# Patient Record
Sex: MALE | Race: WHITE | URBAN - METROPOLITAN AREA
[De-identification: names, ages, dates, MRNs, and addresses within clinical notes are randomized per-mention and may not be internally consistent; named-entity substitution may affect disease eponyms.]

---

## 2017-05-02 ENCOUNTER — OFFICE VISIT (OUTPATIENT)
Dept: FAMILY MEDICINE CLINIC | Age: 42
End: 2017-05-02

## 2017-05-02 VITALS
SYSTOLIC BLOOD PRESSURE: 114 MMHG | DIASTOLIC BLOOD PRESSURE: 64 MMHG | WEIGHT: 166 LBS | HEART RATE: 80 BPM | BODY MASS INDEX: 22.48 KG/M2 | HEIGHT: 72 IN

## 2017-05-02 DIAGNOSIS — R10.31 CHRONIC RLQ PAIN: Primary | ICD-10-CM

## 2017-05-02 DIAGNOSIS — G89.29 CHRONIC RLQ PAIN: Primary | ICD-10-CM

## 2017-05-02 PROCEDURE — 99213 OFFICE O/P EST LOW 20 MIN: CPT | Performed by: FAMILY MEDICINE

## 2017-05-02 RX ORDER — LEVOFLOXACIN 500 MG/1
500 TABLET, FILM COATED ORAL DAILY
Qty: 7 TABLET | Refills: 0 | Status: SHIPPED | OUTPATIENT
Start: 2017-05-02 | End: 2017-05-15 | Stop reason: SDUPTHER

## 2017-05-02 ASSESSMENT — PATIENT HEALTH QUESTIONNAIRE - PHQ9
SUM OF ALL RESPONSES TO PHQ QUESTIONS 1-9: 0
2. FEELING DOWN, DEPRESSED OR HOPELESS: 0
1. LITTLE INTEREST OR PLEASURE IN DOING THINGS: 0
SUM OF ALL RESPONSES TO PHQ9 QUESTIONS 1 & 2: 0

## 2017-05-15 ENCOUNTER — OFFICE VISIT (OUTPATIENT)
Dept: FAMILY MEDICINE CLINIC | Age: 42
End: 2017-05-15

## 2017-05-15 VITALS
HEIGHT: 72 IN | SYSTOLIC BLOOD PRESSURE: 110 MMHG | BODY MASS INDEX: 22.35 KG/M2 | DIASTOLIC BLOOD PRESSURE: 60 MMHG | WEIGHT: 165 LBS | HEART RATE: 84 BPM

## 2017-05-15 DIAGNOSIS — Z00.00 ROUTINE GENERAL MEDICAL EXAMINATION AT A HEALTH CARE FACILITY: Primary | ICD-10-CM

## 2017-05-15 DIAGNOSIS — E03.9 ACQUIRED HYPOTHYROIDISM: ICD-10-CM

## 2017-05-15 DIAGNOSIS — R10.31 RLQ ABDOMINAL PAIN: ICD-10-CM

## 2017-05-15 DIAGNOSIS — Z13.220 SCREENING, LIPID: ICD-10-CM

## 2017-05-15 DIAGNOSIS — Z12.5 SCREENING FOR PROSTATE CANCER: ICD-10-CM

## 2017-05-15 LAB
CHOLESTEROL, TOTAL: 138 MG/DL (ref 0–199)
HDLC SERPL-MCNC: 53 MG/DL (ref 40–60)
LDL CHOLESTEROL CALCULATED: 65 MG/DL
PROSTATE SPECIFIC ANTIGEN: 0.66 NG/ML (ref 0–4)
T4 FREE: 1.6 NG/DL (ref 0.9–1.8)
TRIGL SERPL-MCNC: 101 MG/DL (ref 0–150)
TSH SERPL DL<=0.05 MIU/L-ACNC: 1.51 UIU/ML (ref 0.27–4.2)
VLDLC SERPL CALC-MCNC: 20 MG/DL

## 2017-05-15 PROCEDURE — 36415 COLL VENOUS BLD VENIPUNCTURE: CPT | Performed by: FAMILY MEDICINE

## 2017-05-15 PROCEDURE — 99396 PREV VISIT EST AGE 40-64: CPT | Performed by: FAMILY MEDICINE

## 2017-05-15 RX ORDER — LEVOFLOXACIN 500 MG/1
500 TABLET, FILM COATED ORAL DAILY
Qty: 7 TABLET | Refills: 0 | Status: SHIPPED | OUTPATIENT
Start: 2017-05-15 | End: 2017-05-22

## 2017-06-05 RX ORDER — LEVOTHYROXINE SODIUM 175 UG/1
TABLET ORAL
Qty: 90 TABLET | Refills: 3 | Status: SHIPPED | OUTPATIENT
Start: 2017-06-05 | End: 2018-05-10 | Stop reason: SDUPTHER

## 2017-10-12 RX ORDER — CLOBETASOL PROPIONATE 0.5 MG/G
CREAM TOPICAL
Qty: 60 G | Refills: 6 | Status: SHIPPED | OUTPATIENT
Start: 2017-10-12 | End: 2017-10-30 | Stop reason: SDUPTHER

## 2017-10-30 RX ORDER — CLOBETASOL PROPIONATE 0.5 MG/G
CREAM TOPICAL
Qty: 60 G | Refills: 6 | Status: SHIPPED | OUTPATIENT
Start: 2017-10-30 | End: 2019-09-04 | Stop reason: SDUPTHER

## 2018-05-11 RX ORDER — LEVOTHYROXINE SODIUM 175 UG/1
TABLET ORAL
Qty: 90 TABLET | Refills: 3 | Status: SHIPPED | OUTPATIENT
Start: 2018-05-11 | End: 2019-05-22 | Stop reason: SDUPTHER

## 2019-01-03 ENCOUNTER — OFFICE VISIT (OUTPATIENT)
Dept: FAMILY MEDICINE CLINIC | Age: 44
End: 2019-01-03
Payer: COMMERCIAL

## 2019-01-03 VITALS
WEIGHT: 158 LBS | BODY MASS INDEX: 21.4 KG/M2 | HEART RATE: 96 BPM | RESPIRATION RATE: 16 BRPM | HEIGHT: 72 IN | OXYGEN SATURATION: 99 % | SYSTOLIC BLOOD PRESSURE: 127 MMHG | TEMPERATURE: 97.9 F | DIASTOLIC BLOOD PRESSURE: 73 MMHG

## 2019-01-03 DIAGNOSIS — J01.10 ACUTE NON-RECURRENT FRONTAL SINUSITIS: Primary | ICD-10-CM

## 2019-01-03 DIAGNOSIS — E03.9 ACQUIRED HYPOTHYROIDISM: ICD-10-CM

## 2019-01-03 LAB
T4 FREE: 1.2 NG/DL (ref 0.9–1.8)
TSH SERPL DL<=0.05 MIU/L-ACNC: 3.86 UIU/ML (ref 0.27–4.2)

## 2019-01-03 PROCEDURE — 99214 OFFICE O/P EST MOD 30 MIN: CPT | Performed by: FAMILY MEDICINE

## 2019-01-03 PROCEDURE — 36415 COLL VENOUS BLD VENIPUNCTURE: CPT | Performed by: FAMILY MEDICINE

## 2019-01-03 RX ORDER — AZITHROMYCIN 250 MG/1
TABLET, FILM COATED ORAL
Qty: 1 PACKET | Refills: 0 | Status: SHIPPED | OUTPATIENT
Start: 2019-01-03 | End: 2019-01-13

## 2019-01-03 RX ORDER — PREDNISONE 10 MG/1
10 TABLET ORAL 2 TIMES DAILY
Qty: 10 TABLET | Refills: 0 | Status: SHIPPED | OUTPATIENT
Start: 2019-01-03 | End: 2019-01-08

## 2019-05-22 RX ORDER — LEVOTHYROXINE SODIUM 175 UG/1
TABLET ORAL
Qty: 90 TABLET | Refills: 3 | Status: SHIPPED | OUTPATIENT
Start: 2019-05-22 | End: 2020-06-09

## 2019-09-04 ENCOUNTER — OFFICE VISIT (OUTPATIENT)
Dept: DERMATOLOGY | Age: 44
End: 2019-09-04
Payer: COMMERCIAL

## 2019-09-04 DIAGNOSIS — L40.9 PSORIASIS: Primary | ICD-10-CM

## 2019-09-04 PROCEDURE — 99202 OFFICE O/P NEW SF 15 MIN: CPT | Performed by: DERMATOLOGY

## 2019-09-04 RX ORDER — TACROLIMUS 1 MG/G
OINTMENT TOPICAL
Qty: 60 G | Refills: 6 | Status: SHIPPED | OUTPATIENT
Start: 2019-09-04

## 2019-09-04 RX ORDER — CLOBETASOL PROPIONATE 0.5 MG/G
CREAM TOPICAL
Qty: 60 G | Refills: 11 | Status: SHIPPED | OUTPATIENT
Start: 2019-09-04

## 2019-09-04 NOTE — PROGRESS NOTES
[facility]) Dermatology  Sean Rich M.D.  [phone]       Roya Linares  1975    37 y.o. male     Date of Visit: 9/4/2019    Chief Complaint:   Chief Complaint   Patient presents with    Psoriasis     discuss medications         I was asked to see this patient by Dr. Gonzalez ref. provider found. History of Present Illness:  1. Patient presents today for follow-up of psoriasis-has not been seen in over 3 years. Had previously been using clobetasol cream as needed for his lower legs with excellent improvement. Also uses Protopic 0.1% ointment on his cheeks-within beard area-with excellent improvement. Uses both of these as needed. Also uses an antidandruff shampoo for his scalp but has not been using this to his beard area. Has been limiting his use of clobetasol cream.  Uses this until clear and then avoids this. Continues to be effective. Review of Systems:  Constitutional: Reports general sense of well-being       Past Medical History, Surgical History, Family History, Medications and Allergies reviewed. Social History:   Social History     Socioeconomic History    Marital status:      Spouse name: Not on file    Number of children: Not on file    Years of education: Not on file    Highest education level: Not on file   Occupational History    Not on file   Social Needs    Financial resource strain: Not on file    Food insecurity:     Worry: Not on file     Inability: Not on file    Transportation needs:     Medical: Not on file     Non-medical: Not on file   Tobacco Use    Smoking status: Never Smoker    Smokeless tobacco: Never Used   Substance and Sexual Activity    Alcohol use:  Yes    Drug use: Not on file    Sexual activity: Not on file   Lifestyle    Physical activity:     Days per week: Not on file     Minutes per session: Not on file    Stress: Not on file   Relationships    Social connections:     Talks on phone: Not on file     Gets together: Not on file     Attends Christian service: Not on file     Active member of club or organization: Not on file     Attends meetings of clubs or organizations: Not on file     Relationship status: Not on file    Intimate partner violence:     Fear of current or ex partner: Not on file     Emotionally abused: Not on file     Physically abused: Not on file     Forced sexual activity: Not on file   Other Topics Concern    Not on file   Social History Narrative    Not on file       Physical Examination       -General: Well-appearing, NAD  1. Well-developed well-nourished. Erythematous well-demarcated scaly plaques bilateral lower legs. Mild erythema and scale upper lip within mustache. Scalp relatively clear today  Total body surface area less than 5%    Assessment and Plan     1. Psoriasis-discussed treatment options for patient. Reviewed immunosuppressants which he is not interested in for now but we could revisit if his psoriasis worsens. Protopic 0.1% ointment as needed for his face-also discussed washing with a zinc pyrithione based antidandruff shampoo for both his scalp and beard. Clobetasol cream twice daily up to 2 weeks as needed bilateral lower legs. Refills provided.   Reviewed proper use and side effects

## 2019-09-17 ENCOUNTER — TELEPHONE (OUTPATIENT)
Dept: FAMILY MEDICINE CLINIC | Age: 44
End: 2019-09-17

## 2019-09-17 ENCOUNTER — OFFICE VISIT (OUTPATIENT)
Dept: FAMILY MEDICINE CLINIC | Age: 44
End: 2019-09-17
Payer: COMMERCIAL

## 2019-09-17 VITALS
DIASTOLIC BLOOD PRESSURE: 80 MMHG | HEIGHT: 72 IN | WEIGHT: 160.8 LBS | BODY MASS INDEX: 21.78 KG/M2 | OXYGEN SATURATION: 98 % | HEART RATE: 85 BPM | SYSTOLIC BLOOD PRESSURE: 130 MMHG | RESPIRATION RATE: 18 BRPM | TEMPERATURE: 98.1 F

## 2019-09-17 DIAGNOSIS — L03.116 CELLULITIS OF LEFT LOWER EXTREMITY: Primary | ICD-10-CM

## 2019-09-17 PROCEDURE — 99213 OFFICE O/P EST LOW 20 MIN: CPT | Performed by: FAMILY MEDICINE

## 2019-09-17 RX ORDER — CLINDAMYCIN HYDROCHLORIDE 300 MG/1
300 CAPSULE ORAL 4 TIMES DAILY
Qty: 28 CAPSULE | Refills: 0 | Status: SHIPPED | OUTPATIENT
Start: 2019-09-17 | End: 2019-09-24

## 2019-09-17 ASSESSMENT — PATIENT HEALTH QUESTIONNAIRE - PHQ9
SUM OF ALL RESPONSES TO PHQ QUESTIONS 1-9: 0
SUM OF ALL RESPONSES TO PHQ9 QUESTIONS 1 & 2: 0
1. LITTLE INTEREST OR PLEASURE IN DOING THINGS: 0
SUM OF ALL RESPONSES TO PHQ QUESTIONS 1-9: 0
2. FEELING DOWN, DEPRESSED OR HOPELESS: 0

## 2019-09-25 ENCOUNTER — OFFICE VISIT (OUTPATIENT)
Dept: FAMILY MEDICINE CLINIC | Age: 44
End: 2019-09-25
Payer: COMMERCIAL

## 2019-09-25 VITALS
SYSTOLIC BLOOD PRESSURE: 110 MMHG | DIASTOLIC BLOOD PRESSURE: 69 MMHG | HEIGHT: 72 IN | BODY MASS INDEX: 21.81 KG/M2 | WEIGHT: 161 LBS | OXYGEN SATURATION: 98 % | RESPIRATION RATE: 18 BRPM | HEART RATE: 78 BPM

## 2019-09-25 DIAGNOSIS — L03.116 CELLULITIS OF LEFT LOWER EXTREMITY: ICD-10-CM

## 2019-09-25 DIAGNOSIS — Z12.5 SCREENING FOR MALIGNANT NEOPLASM OF PROSTATE: ICD-10-CM

## 2019-09-25 DIAGNOSIS — Z00.00 ROUTINE GENERAL MEDICAL EXAMINATION AT A HEALTH CARE FACILITY: Primary | ICD-10-CM

## 2019-09-25 DIAGNOSIS — Z13.220 SCREENING, LIPID: ICD-10-CM

## 2019-09-25 LAB
CHOLESTEROL, TOTAL: 143 MG/DL (ref 0–199)
HDLC SERPL-MCNC: 42 MG/DL (ref 40–60)
LDL CHOLESTEROL CALCULATED: 66 MG/DL
PROSTATE SPECIFIC ANTIGEN: 0.72 NG/ML (ref 0–4)
TRIGL SERPL-MCNC: 174 MG/DL (ref 0–150)
VLDLC SERPL CALC-MCNC: 35 MG/DL

## 2019-09-25 PROCEDURE — 99396 PREV VISIT EST AGE 40-64: CPT | Performed by: FAMILY MEDICINE

## 2019-09-25 PROCEDURE — 36415 COLL VENOUS BLD VENIPUNCTURE: CPT | Performed by: FAMILY MEDICINE

## 2019-09-25 NOTE — PROGRESS NOTES
Chief Complaint   Patient presents with    Cellulitis     No family history on file. Social History     Socioeconomic History    Marital status:      Spouse name: Not on file    Number of children: Not on file    Years of education: Not on file    Highest education level: Not on file   Occupational History    Not on file   Social Needs    Financial resource strain: Not on file    Food insecurity:     Worry: Not on file     Inability: Not on file    Transportation needs:     Medical: Not on file     Non-medical: Not on file   Tobacco Use    Smoking status: Never Smoker    Smokeless tobacco: Never Used   Substance and Sexual Activity    Alcohol use:  Yes    Drug use: Not on file    Sexual activity: Not on file   Lifestyle    Physical activity:     Days per week: Not on file     Minutes per session: Not on file    Stress: Not on file   Relationships    Social connections:     Talks on phone: Not on file     Gets together: Not on file     Attends Sabianism service: Not on file     Active member of club or organization: Not on file     Attends meetings of clubs or organizations: Not on file     Relationship status: Not on file    Intimate partner violence:     Fear of current or ex partner: Not on file     Emotionally abused: Not on file     Physically abused: Not on file     Forced sexual activity: Not on file   Other Topics Concern    Not on file   Social History Narrative    Not on file       Current Outpatient Medications:     clobetasol (TEMOVATE) 0.05 % cream, Apply to affected area BID PRN flares, Disp: 60 g, Rfl: 11    tacrolimus (PROTOPIC) 0.1 % ointment, Apply to face/ groin BID, Disp: 60 g, Rfl: 6    levothyroxine (SYNTHROID) 175 MCG tablet, TAKE 1 TABLET BY MOUTH EVERY DAY, Disp: 90 tablet, Rfl: 3  No Known Allergies    Patient Active Problem List   Diagnosis    Pruritus ani    Influenza with respiratory manifestation other than pneumonia    Allergic rhinitis   

## 2020-06-09 RX ORDER — LEVOTHYROXINE SODIUM 175 UG/1
TABLET ORAL
Qty: 90 TABLET | Refills: 3 | Status: SHIPPED | OUTPATIENT
Start: 2020-06-09 | End: 2021-06-11

## 2021-01-15 ENCOUNTER — TELEPHONE (OUTPATIENT)
Dept: FAMILY MEDICINE CLINIC | Age: 46
End: 2021-01-15

## 2021-01-15 NOTE — TELEPHONE ENCOUNTER
Pt called in wanting to know if Dr. Nick Puckett had any recommendations for someone who can do a vasectomy on the [address] [place] [address] side.      Please call PT back: [phone]

## 2021-01-15 NOTE — TELEPHONE ENCOUNTER
Yes.  Pt needs OV F2F FEB for thryoid f/u  Lab Results   Component Value Date    TSH 3.86 01/03/2019     Suggest we arrange at that visit

## 2021-01-29 ENCOUNTER — OFFICE VISIT (OUTPATIENT)
Dept: FAMILY MEDICINE CLINIC | Age: 46
End: 2021-01-29
Payer: COMMERCIAL

## 2021-01-29 VITALS
BODY MASS INDEX: 23.7 KG/M2 | HEIGHT: 72 IN | TEMPERATURE: 97.9 F | WEIGHT: 175 LBS | SYSTOLIC BLOOD PRESSURE: 116 MMHG | HEART RATE: 82 BPM | DIASTOLIC BLOOD PRESSURE: 72 MMHG | OXYGEN SATURATION: 98 %

## 2021-01-29 DIAGNOSIS — Z00.00 ROUTINE GENERAL MEDICAL EXAMINATION AT A HEALTH CARE FACILITY: Primary | ICD-10-CM

## 2021-01-29 DIAGNOSIS — Z13.220 SCREENING, LIPID: ICD-10-CM

## 2021-01-29 DIAGNOSIS — Z12.5 SCREENING FOR MALIGNANT NEOPLASM OF PROSTATE: ICD-10-CM

## 2021-01-29 DIAGNOSIS — Z30.09 FAMILY PLANNING: ICD-10-CM

## 2021-01-29 DIAGNOSIS — E03.9 ACQUIRED HYPOTHYROIDISM: ICD-10-CM

## 2021-01-29 LAB
CHOLESTEROL, TOTAL: 160 MG/DL (ref 0–199)
HDLC SERPL-MCNC: 43 MG/DL (ref 40–60)
LDL CHOLESTEROL CALCULATED: 86 MG/DL
PROSTATE SPECIFIC ANTIGEN: 0.49 NG/ML (ref 0–4)
T4 FREE: 1.4 NG/DL (ref 0.9–1.8)
TRIGL SERPL-MCNC: 156 MG/DL (ref 0–150)
TSH SERPL DL<=0.05 MIU/L-ACNC: 2.7 UIU/ML (ref 0.27–4.2)
VLDLC SERPL CALC-MCNC: 31 MG/DL

## 2021-01-29 PROCEDURE — 36415 COLL VENOUS BLD VENIPUNCTURE: CPT | Performed by: FAMILY MEDICINE

## 2021-01-29 PROCEDURE — 99396 PREV VISIT EST AGE 40-64: CPT | Performed by: FAMILY MEDICINE

## 2021-01-29 ASSESSMENT — PATIENT HEALTH QUESTIONNAIRE - PHQ9
SUM OF ALL RESPONSES TO PHQ QUESTIONS 1-9: 0
SUM OF ALL RESPONSES TO PHQ QUESTIONS 1-9: 0
2. FEELING DOWN, DEPRESSED OR HOPELESS: 0
SUM OF ALL RESPONSES TO PHQ9 QUESTIONS 1 & 2: 0
SUM OF ALL RESPONSES TO PHQ QUESTIONS 1-9: 0
1. LITTLE INTEREST OR PLEASURE IN DOING THINGS: 0

## 2021-01-29 NOTE — PROGRESS NOTES
2021    Nelda Barton (:  1975) is a 39 y.o. male, here for evaluation of the following chief complaint(s):  Follow-up and Referral - General (wants a referral to get a vasectomy )      ASSESSMENT/PLAN:     Diagnosis Orders   1. Routine general medical examination at a health care facility     2. Screening, lipid  Lipid Panel   3. Screening for malignant neoplasm of prostate  Psa screening   4. Acquired hypothyroidism  TSH without Reflex    T4, Free   5. Family planning  Mohan Long MD, [facility], [facility]    ref Dr. Irineo Sánchez       Return in about 1 year (around 2022). An electronic signature was used to authenticate this note.     @SIG@    SUBJECTIVE/OBJECTIVE:    HPI / ROS  # Preventive and other issues  # Lipids - recent screening history:  Lab Results   Component Value Date    LDLCALC 66 2019     Lab Results   Component Value Date    TRIG 174 (H) 2019     Lab Results   Component Value Date    HDL 42 2019     Lab Results   Component Value Date    CHOL 143 2019     # PSA screening history:  Lab Results   Component Value Date    PSA 0.72 2019    PSA 0.66 05/15/2017     # hypothyroidism on Levo  Lab Results   Component Value Date    TSH 3.86 2019             Wt Readings from Last 3 Encounters:   21 175 lb (79.4 kg)   19 161 lb (73 kg)   19 160 lb 12.8 oz (72.9 kg)       BP Readings from Last 3 Encounters:   21 116/72   19 110/69   19 130/80       PHYSICAL EXAM  Vitals:    21 1451   BP: 116/72   Pulse: 82   Temp: 97.9 °F (36.6 °C)   SpO2: 98%   Weight: 175 lb (79.4 kg)   Height: 6' (1.829 m)     A&o  Car reg no MGR  Lungs cta

## 2021-09-07 NOTE — TELEPHONE ENCOUNTER
Request levothyroxine last filled 6/11/21                  Last ov 1/29/21                                                   Next ov not scheduled

## 2021-09-08 RX ORDER — LEVOTHYROXINE SODIUM 175 UG/1
TABLET ORAL
Qty: 90 TABLET | Refills: 3 | Status: SHIPPED | OUTPATIENT
Start: 2021-09-08 | End: 2022-09-01

## 2021-12-06 ENCOUNTER — VIRTUAL VISIT (OUTPATIENT)
Dept: FAMILY MEDICINE CLINIC | Age: 46
End: 2021-12-06
Payer: COMMERCIAL

## 2021-12-06 ENCOUNTER — TELEPHONE (OUTPATIENT)
Dept: FAMILY MEDICINE CLINIC | Age: 46
End: 2021-12-06

## 2021-12-06 DIAGNOSIS — H65.113 ACUTE ALLERGIC MUCOID OTITIS MEDIA, BILATERAL: Primary | ICD-10-CM

## 2021-12-06 PROCEDURE — 99213 OFFICE O/P EST LOW 20 MIN: CPT | Performed by: FAMILY MEDICINE

## 2021-12-06 RX ORDER — AZITHROMYCIN 250 MG/1
TABLET, FILM COATED ORAL
Qty: 1 PACKET | Refills: 0 | Status: SHIPPED | OUTPATIENT
Start: 2021-12-06 | End: 2021-12-16

## 2021-12-06 RX ORDER — PREDNISONE 10 MG/1
10 TABLET ORAL 2 TIMES DAILY
Qty: 10 TABLET | Refills: 0 | Status: SHIPPED | OUTPATIENT
Start: 2021-12-06 | End: 2021-12-11

## 2021-12-06 NOTE — TELEPHONE ENCOUNTER
Chest cold, going into sinus, no fever. Pt would like to be seen. Please advise.  Pt is reachable at [phone]

## 2021-12-06 NOTE — PROGRESS NOTES
2021    Priyanka Castro (:  1975) is a 55 y.o. male, here for evaluation of the following chief complaint(s):  No chief complaint on file. ASSESSMENT/PLAN:     Diagnosis Orders   1. Acute allergic mucoid otitis media, bilateral  azithromycin (ZITHROMAX Z-PAZ) 250 MG tablet    prednisone z-pack call INB       No follow-ups on file. An electronic signature was used to authenticate this note. @SIG@    SUBJECTIVE/OBJECTIVE:  (NOTE : prior results listed below reviewed at this visit to assist in medical decision making.)    HPI / ROS    # acute chest cold no fever typical yearly. No loss taste smell no bad headache Cough congtestion PND. Wt Readings from Last 3 Encounters:   21 175 lb (79.4 kg)   19 161 lb (73 kg)   19 160 lb 12.8 oz (72.9 kg)       BP Readings from Last 3 Encounters:   21 116/72   19 110/69   19 130/80           TELEHEALTH EVALUATION -- Audio/Visual (During [facility]-71 public health emergency)      Priyanka Castro (:  1975) is being evaluated by a Virtual Visit (video visit) encounter to address concerns as mentioned above. A caregiver was present when appropriate. Due to this being a TeleHealth encounter ([facility]), evaluation of the following organ systems was limited: Vitals/Constitutional/EENT/Resp/CV/GI//MS/Neuro/Skin/Heme-Lymph-Imm. Pursuant to the emergency declaration under the [address] [place] [address], [address] [place] [address] authority and the [employer] Act, this Virtual Visit was conducted with patient's (and/or legal guardian's) consent, to reduce the patient's risk of exposure to COVID-19 and provide necessary medical care.   The patient (and/or legal guardian) has also been advised to contact this office for worsening conditions or problems, and seek emergency medical treatment and/or call 911 if deemed necessary. Patient identification was verified at the start of the visit: Yes    Total time spent on this encounter: Not billed by time    Services were provided through a video synchronous discussion virtually to substitute for in-person clinic visit. Patient and provider were located at their individual homes. --Ana Acosta MD on 12/6/2021 at 10:05 AM    An electronic signature was used to authenticate this note.

## 2022-08-30 ENCOUNTER — OFFICE VISIT (OUTPATIENT)
Dept: FAMILY MEDICINE CLINIC | Age: 47
End: 2022-08-30
Payer: COMMERCIAL

## 2022-08-30 VITALS
DIASTOLIC BLOOD PRESSURE: 66 MMHG | BODY MASS INDEX: 23.19 KG/M2 | SYSTOLIC BLOOD PRESSURE: 118 MMHG | HEART RATE: 94 BPM | RESPIRATION RATE: 16 BRPM | OXYGEN SATURATION: 98 % | WEIGHT: 171 LBS

## 2022-08-30 DIAGNOSIS — Z23 NEED FOR DIPHTHERIA-TETANUS-PERTUSSIS (TDAP) VACCINE: ICD-10-CM

## 2022-08-30 DIAGNOSIS — Z12.5 SCREENING PSA (PROSTATE SPECIFIC ANTIGEN): ICD-10-CM

## 2022-08-30 DIAGNOSIS — E03.9 ACQUIRED HYPOTHYROIDISM: ICD-10-CM

## 2022-08-30 DIAGNOSIS — Z13.220 SCREENING, LIPID: ICD-10-CM

## 2022-08-30 DIAGNOSIS — Z00.00 ROUTINE GENERAL MEDICAL EXAMINATION AT A HEALTH CARE FACILITY: Primary | ICD-10-CM

## 2022-08-30 LAB
CHOLESTEROL, TOTAL: 177 MG/DL (ref 0–199)
HDLC SERPL-MCNC: 45 MG/DL (ref 40–60)
LDL CHOLESTEROL CALCULATED: 107 MG/DL
PROSTATE SPECIFIC ANTIGEN: 0.52 NG/ML (ref 0–4)
T4 FREE: 1.7 NG/DL (ref 0.9–1.8)
TRIGL SERPL-MCNC: 126 MG/DL (ref 0–150)
TSH SERPL DL<=0.05 MIU/L-ACNC: 0.58 UIU/ML (ref 0.27–4.2)
VLDLC SERPL CALC-MCNC: 25 MG/DL

## 2022-08-30 PROCEDURE — 90715 TDAP VACCINE 7 YRS/> IM: CPT | Performed by: FAMILY MEDICINE

## 2022-08-30 PROCEDURE — 36415 COLL VENOUS BLD VENIPUNCTURE: CPT | Performed by: FAMILY MEDICINE

## 2022-08-30 PROCEDURE — 90471 IMMUNIZATION ADMIN: CPT | Performed by: FAMILY MEDICINE

## 2022-08-30 PROCEDURE — 99396 PREV VISIT EST AGE 40-64: CPT | Performed by: FAMILY MEDICINE

## 2022-08-30 SDOH — ECONOMIC STABILITY: FOOD INSECURITY: WITHIN THE PAST 12 MONTHS, YOU WORRIED THAT YOUR FOOD WOULD RUN OUT BEFORE YOU GOT MONEY TO BUY MORE.: NEVER TRUE

## 2022-08-30 SDOH — ECONOMIC STABILITY: FOOD INSECURITY: WITHIN THE PAST 12 MONTHS, THE FOOD YOU BOUGHT JUST DIDN'T LAST AND YOU DIDN'T HAVE MONEY TO GET MORE.: NEVER TRUE

## 2022-08-30 ASSESSMENT — PATIENT HEALTH QUESTIONNAIRE - PHQ9
SUM OF ALL RESPONSES TO PHQ QUESTIONS 1-9: 0
2. FEELING DOWN, DEPRESSED OR HOPELESS: 0
SUM OF ALL RESPONSES TO PHQ9 QUESTIONS 1 & 2: 0
1. LITTLE INTEREST OR PLEASURE IN DOING THINGS: 0

## 2022-08-30 ASSESSMENT — SOCIAL DETERMINANTS OF HEALTH (SDOH): HOW HARD IS IT FOR YOU TO PAY FOR THE VERY BASICS LIKE FOOD, HOUSING, MEDICAL CARE, AND HEATING?: NOT HARD AT ALL

## 2022-08-30 NOTE — PROGRESS NOTES
2022    Landon Capellan (:  1975) is a 55 y.o. male, here for evaluation of the following chief complaint(s):  Hypothyroidism      ASSESSMENT/PLAN:     Diagnosis Orders   1. Routine general medical examination at a health care facility        2. Screening, lipid  Lipid Panel      3. Screening PSA (prostate specific antigen)  PSA Screening      4. Acquired hypothyroidism  T4, Free    TSH    labs ob levo adjust RX      5. Need for diphtheria-tetanus-pertussis (Tdap) vaccine  Tdap, ADACEL, (age 10y-63y), IM          Return in about 1 year (around 2023) for Well Adult, screen psa, screen lipid. An electronic signature was used to authenticate this note.     SUBJECTIVE/OBJECTIVE:  (NOTE : prior results listed below reviewed at this visit to assist in medical decision making.)    HPI / ROS    # Preventive and other issues  # PSA screening history:  Lab Results   Component Value Date    PSA 0.49 2021    PSA 0.72 2019    PSA 0.66 05/15/2017     # Lipids - recent screening history:  Lab Results   Component Value Date    LDLCALC 86 2021     Lab Results   Component Value Date    TRIG 156 (H) 2021     Lab Results   Component Value Date    HDL 43 2021     Lab Results   Component Value Date    CHOL 160 2021     # hypothyroidism on Levo  Lab Results   Component Value Date    TSH 2.70 2021    T4FREE 1.4 2021               Wt Readings from Last 3 Encounters:   22 171 lb (77.6 kg)   21 175 lb (79.4 kg)   19 161 lb (73 kg)       BP Readings from Last 3 Encounters:   22 118/66   21 116/72   19 110/69       PHYSICAL EXAM  Vitals:    22   BP: 118/66   Site: Left Upper Arm   Position: Sitting   Cuff Size: Medium Adult   Pulse: 94   Resp: 16   SpO2: 98%   Weight: 171 lb (77.6 kg)     A&o  Neck no TMG no bruit  Car reg no MGR  Lungs cta  Ext no edema  Skin no jaundice  Eyes anicteric

## 2022-09-01 RX ORDER — LEVOTHYROXINE SODIUM 175 UG/1
TABLET ORAL
Qty: 90 TABLET | Refills: 3 | Status: SHIPPED | OUTPATIENT
Start: 2022-09-01

## 2022-12-29 ENCOUNTER — TELEPHONE (OUTPATIENT)
Dept: FAMILY MEDICINE CLINIC | Age: 47
End: 2022-12-29

## 2022-12-29 NOTE — TELEPHONE ENCOUNTER
I spoke with patient and he states he can not make it today due to being to far away but he is scheduled on 1/4. I also advised the patient if things gets worse to go be evaluated at the ER. Patient expressed understanding.

## 2022-12-29 NOTE — TELEPHONE ENCOUNTER
Pt states the top of his feet, just behind his toes, the past couple weeks has been retaining fluid. If you push down on it it leaves an indentation and it stays for a while. Please advise.  [phone]

## 2022-12-29 NOTE — TELEPHONE ENCOUNTER
Patient needs office visit. I have one opening today if he can be here by 3:40. Otherwise schedule for next week. Please document call and then close encounter.   thanks

## 2023-01-04 ENCOUNTER — OFFICE VISIT (OUTPATIENT)
Dept: FAMILY MEDICINE CLINIC | Age: 48
End: 2023-01-04

## 2023-01-04 VITALS
BODY MASS INDEX: 23.46 KG/M2 | WEIGHT: 173 LBS | SYSTOLIC BLOOD PRESSURE: 108 MMHG | HEART RATE: 81 BPM | OXYGEN SATURATION: 98 % | DIASTOLIC BLOOD PRESSURE: 71 MMHG

## 2023-01-04 DIAGNOSIS — M79.89 SWELLING OF LOWER EXTREMITY: Primary | ICD-10-CM

## 2023-01-04 LAB
A/G RATIO: 2.2 (ref 1.1–2.2)
ALBUMIN SERPL-MCNC: 4.6 G/DL (ref 3.4–5)
ALP BLD-CCNC: 89 U/L (ref 40–129)
ALT SERPL-CCNC: 25 U/L (ref 10–40)
ANION GAP SERPL CALCULATED.3IONS-SCNC: 9 MMOL/L (ref 3–16)
AST SERPL-CCNC: 23 U/L (ref 15–37)
BILIRUB SERPL-MCNC: <0.2 MG/DL (ref 0–1)
BUN BLDV-MCNC: 13 MG/DL (ref 7–20)
CALCIUM SERPL-MCNC: 9.5 MG/DL (ref 8.3–10.6)
CHLORIDE BLD-SCNC: 103 MMOL/L (ref 99–110)
CO2: 27 MMOL/L (ref 21–32)
CREAT SERPL-MCNC: 0.7 MG/DL (ref 0.9–1.3)
GFR SERPL CREATININE-BSD FRML MDRD: >60 ML/MIN/{1.73_M2}
GLUCOSE BLD-MCNC: 87 MG/DL (ref 70–99)
POTASSIUM SERPL-SCNC: 4.3 MMOL/L (ref 3.5–5.1)
PRO-BNP: 14 PG/ML (ref 0–124)
SODIUM BLD-SCNC: 139 MMOL/L (ref 136–145)
TOTAL PROTEIN: 6.7 G/DL (ref 6.4–8.2)

## 2023-01-04 ASSESSMENT — PATIENT HEALTH QUESTIONNAIRE - PHQ9
SUM OF ALL RESPONSES TO PHQ QUESTIONS 1-9: 0
SUM OF ALL RESPONSES TO PHQ QUESTIONS 1-9: 0
SUM OF ALL RESPONSES TO PHQ9 QUESTIONS 1 & 2: 0
SUM OF ALL RESPONSES TO PHQ QUESTIONS 1-9: 0
2. FEELING DOWN, DEPRESSED OR HOPELESS: 0
1. LITTLE INTEREST OR PLEASURE IN DOING THINGS: 0
SUM OF ALL RESPONSES TO PHQ QUESTIONS 1-9: 0

## 2023-01-04 NOTE — PROGRESS NOTES
PROGRESS NOTE     [facility]  [address] [facility]  [address] [address]  [phone] office  [phone] fax    Date of Service:  1/4/2023    Assessment / Plan:     1. Swelling of lower extremity  Very localized. No obvious signs of heart failure but will check BNP to rule out. Checking liver and kidney function as they have not been checked recently. Likely due to arthritis vs poor venous return. Recommend elevating legs after work each day. He has no complaints of pain. Notify office if symptoms do not improve. Would consider xrays of feet in future. - Comprehensive Metabolic Panel  - Brain Natriuretic Peptide    Subjective:      Patient ID: . Jaime Rosen is a 52 y.o. male      CC: Lower extremity swelling    HPI  Patient complains of a couple week history of swelling in his feet. No injury. No change in diet or activity. Swelling is localized to just behind his toes. No ankle swelling. No SOB. No chest pain. Vitals:    01/04/23 1523   BP: 108/71   Site: Right Upper Arm   Position: Sitting   Pulse: 81   SpO2: 98%   Weight: 173 lb (78.5 kg)       Outpatient Medications Marked as Taking for the 1/4/23 encounter (Office Visit) with KILEY Graham - CNP   Medication Sig Dispense Refill    levothyroxine (SYNTHROID) 175 MCG tablet TAKE 1 TABLET BY MOUTH EVERY DAY 90 tablet 3    clobetasol (TEMOVATE) 0.05 % cream Apply to affected area BID PRN flares 60 g 11    tacrolimus (PROTOPIC) 0.1 % ointment Apply to face/ groin BID 60 g 6       Past Medical History:   Diagnosis Date    Allergic rhinitis, cause unspecified     Influenza with other respiratory manifestations     Pruritus ani     Unspecified hypothyroidism        No past surgical history on file. Social History     Tobacco Use    Smoking status: Never    Smokeless tobacco: Never   Substance Use Topics    Alcohol use: Yes       No family history on file.         Review of Systems  Constitutional: Negative for activity or appetite change, fever or fatigue  HENT:  Negative for congestion,sinus pressure, or rhinorrhea  Eyes:  Negative for eye pain or visual changes  Resp:  Negative for SOB, chest tightness, cough  Cardiovascular: Negative for CP, palpitations, ORTIZ, orthopnea, PND. + LE edema  Gastrointestinal: Negative for abd pain, melena, BRBPR, N/V/D  Endocrine:  Negative for polydipsia and polyuria  :  Negative for dysuria, flank pain or urinary frequency  Musculoskeletal:  Negative for back pain or myalgias  Neuro:  Negative for dizziness or lightheadedness  Psych: negative for depression or anxiety      Objective:   Constitutional:   Reviewed vitals above  Well Nourished, well developed, no distress       HENT:  Normal external nose without lesions  Neck:  Symmetric and without masses  Resp:  Normal effort  Clear to auscultation bilaterally without rhonchi, wheezing or crackles  Cardiovascular:   On auscultation, normal S1 and S2 without murmurs, rubs or gallops  +1 pitting edema to dorsal surface of bilateral feet just below toes  Musculoskeletal:  Normal Gait  All extremities without clubbing, cyanosis or edema  Skin:  No rashes on inspection  No areas of increased heat or induration on palpation  Psych:  Normal mood and affect  Normal insight and judgement

## 2023-09-06 RX ORDER — LEVOTHYROXINE SODIUM 175 UG/1
TABLET ORAL
Qty: 90 TABLET | Refills: 3 | OUTPATIENT
Start: 2023-09-06

## 2023-09-06 NOTE — TELEPHONE ENCOUNTER
Medication:   Requested Prescriptions     Pending Prescriptions Disp Refills    levothyroxine (SYNTHROID) 175 MCG tablet [Pharmacy Med Name: LEVOTHYROXINE 175 MCG TABLET] 90 tablet 3     Sig: TAKE 1 TABLET BY MOUTH EVERY DAY       Last Filled:      Patient Phone Number: [phone] (home)     Last appt: 1/4/2023   Next appt: Visit date not found

## 2023-09-11 ENCOUNTER — OFFICE VISIT (OUTPATIENT)
Dept: FAMILY MEDICINE CLINIC | Age: 48
End: 2023-09-11
Payer: COMMERCIAL

## 2023-09-11 VITALS
OXYGEN SATURATION: 98 % | SYSTOLIC BLOOD PRESSURE: 122 MMHG | HEART RATE: 82 BPM | HEIGHT: 72 IN | BODY MASS INDEX: 22.59 KG/M2 | DIASTOLIC BLOOD PRESSURE: 62 MMHG | WEIGHT: 166.8 LBS

## 2023-09-11 DIAGNOSIS — Z12.11 SCREENING FOR COLON CANCER: ICD-10-CM

## 2023-09-11 DIAGNOSIS — E03.9 HYPOTHYROIDISM, UNSPECIFIED TYPE: ICD-10-CM

## 2023-09-11 DIAGNOSIS — Z00.00 ANNUAL PHYSICAL EXAM: Primary | ICD-10-CM

## 2023-09-11 LAB
ANION GAP SERPL CALCULATED.3IONS-SCNC: 8 MMOL/L (ref 3–16)
BUN SERPL-MCNC: 13 MG/DL (ref 7–20)
CALCIUM SERPL-MCNC: 9.6 MG/DL (ref 8.3–10.6)
CHLORIDE SERPL-SCNC: 104 MMOL/L (ref 99–110)
CHOLEST SERPL-MCNC: 149 MG/DL (ref 0–199)
CO2 SERPL-SCNC: 28 MMOL/L (ref 21–32)
CREAT SERPL-MCNC: 0.7 MG/DL (ref 0.9–1.3)
DEPRECATED RDW RBC AUTO: 13.8 % (ref 12.4–15.4)
GFR SERPLBLD CREATININE-BSD FMLA CKD-EPI: >60 ML/MIN/{1.73_M2}
GLUCOSE SERPL-MCNC: 108 MG/DL (ref 70–99)
HCT VFR BLD AUTO: 40.2 % (ref 40.5–52.5)
HDLC SERPL-MCNC: 51 MG/DL (ref 40–60)
HGB BLD-MCNC: 14 G/DL (ref 13.5–17.5)
LDLC SERPL CALC-MCNC: 69 MG/DL
MCH RBC QN AUTO: 30.3 PG (ref 26–34)
MCHC RBC AUTO-ENTMCNC: 34.8 G/DL (ref 31–36)
MCV RBC AUTO: 87.1 FL (ref 80–100)
PLATELET # BLD AUTO: 186 K/UL (ref 135–450)
PMV BLD AUTO: 8.2 FL (ref 5–10.5)
POTASSIUM SERPL-SCNC: 3.9 MMOL/L (ref 3.5–5.1)
RBC # BLD AUTO: 4.62 M/UL (ref 4.2–5.9)
SODIUM SERPL-SCNC: 140 MMOL/L (ref 136–145)
T4 FREE SERPL-MCNC: 1.6 NG/DL (ref 0.9–1.8)
TRIGL SERPL-MCNC: 144 MG/DL (ref 0–150)
TSH SERPL DL<=0.005 MIU/L-ACNC: 0.71 UIU/ML (ref 0.27–4.2)
VLDLC SERPL CALC-MCNC: 29 MG/DL
WBC # BLD AUTO: 4.8 K/UL (ref 4–11)

## 2023-09-11 PROCEDURE — 36415 COLL VENOUS BLD VENIPUNCTURE: CPT | Performed by: NURSE PRACTITIONER

## 2023-09-11 PROCEDURE — 99214 OFFICE O/P EST MOD 30 MIN: CPT | Performed by: NURSE PRACTITIONER

## 2023-09-11 RX ORDER — LEVOTHYROXINE SODIUM 175 UG/1
175 TABLET ORAL DAILY
Qty: 90 TABLET | Refills: 3 | Status: SHIPPED | OUTPATIENT
Start: 2023-09-11

## 2023-09-11 SDOH — ECONOMIC STABILITY: HOUSING INSECURITY
IN THE LAST 12 MONTHS, WAS THERE A TIME WHEN YOU DID NOT HAVE A STEADY PLACE TO SLEEP OR SLEPT IN A SHELTER (INCLUDING NOW)?: NO

## 2023-09-11 SDOH — ECONOMIC STABILITY: FOOD INSECURITY: WITHIN THE PAST 12 MONTHS, YOU WORRIED THAT YOUR FOOD WOULD RUN OUT BEFORE YOU GOT MONEY TO BUY MORE.: NEVER TRUE

## 2023-09-11 SDOH — ECONOMIC STABILITY: INCOME INSECURITY: HOW HARD IS IT FOR YOU TO PAY FOR THE VERY BASICS LIKE FOOD, HOUSING, MEDICAL CARE, AND HEATING?: NOT HARD AT ALL

## 2023-09-11 SDOH — ECONOMIC STABILITY: FOOD INSECURITY: WITHIN THE PAST 12 MONTHS, THE FOOD YOU BOUGHT JUST DIDN'T LAST AND YOU DIDN'T HAVE MONEY TO GET MORE.: NEVER TRUE

## 2023-09-11 ASSESSMENT — PATIENT HEALTH QUESTIONNAIRE - PHQ9
1. LITTLE INTEREST OR PLEASURE IN DOING THINGS: 0
SUM OF ALL RESPONSES TO PHQ QUESTIONS 1-9: 0
SUM OF ALL RESPONSES TO PHQ9 QUESTIONS 1 & 2: 0
SUM OF ALL RESPONSES TO PHQ QUESTIONS 1-9: 0
2. FEELING DOWN, DEPRESSED OR HOPELESS: 0

## 2023-09-11 NOTE — PROGRESS NOTES
PROGRESS NOTE     [facility] Physicians  [address], [address] [facility]  [phone] office  [phone] fax    Date of Service:  9/11/2023    Subjective:      Patient ID: Gabriel Rodgers is a 52 y.o. male      CC: thyroid check and physical exam    HPI  Patient presents today for thyroid check along with physical exam. Denies any concerns today. PMH include hypothyroidism - synthroid working well. Denies issues with diet, exercise, sleep, anxiety and depression. Overall, doing well. Vitals:    09/11/23 1035   BP: 122/62   Pulse: 82   SpO2: 98%   Weight: 166 lb 12.8 oz (75.7 kg)   Height: 6' (1.829 m)       Outpatient Medications Marked as Taking for the 9/11/23 encounter (Office Visit) with KILEY Deleon CNP   Medication Sig Dispense Refill    levothyroxine (SYNTHROID) 175 MCG tablet Take 1 tablet by mouth daily 90 tablet 3    clobetasol (TEMOVATE) 0.05 % cream Apply to affected area BID PRN flares 60 g 11    tacrolimus (PROTOPIC) 0.1 % ointment Apply to face/ groin BID 60 g 6       Past Medical History:   Diagnosis Date    Allergic rhinitis, cause unspecified     Influenza with other respiratory manifestations     Pruritus ani     Unspecified hypothyroidism        No past surgical history on file. Social History     Tobacco Use    Smoking status: Never    Smokeless tobacco: Never   Substance Use Topics    Alcohol use: Yes       No family history on file.         Review of Systems  Constitutional:  Negative for activity or appetite change, fever or fatigue  HENT:  Negative for congestion,sinus pressure, or rhinorrhea  Eyes:  Negative for eye pain or visual changes  Resp:  Negative for SOB, chest tightness, cough  Cardiovascular: Negative for CP, palpitations, ORTIZ, orthopnea, PND, LE edema  Gastrointestinal: Negative for abd pain, melena, BRBPR, N/V/D  Endocrine:  Negative for polydipsia and polyuria  :  Negative for dysuria, flank pain or

## 2024-08-17 ENCOUNTER — APPOINTMENT (OUTPATIENT)
Dept: CT IMAGING | Age: 49
End: 2024-08-17
Payer: COMMERCIAL

## 2024-08-17 ENCOUNTER — HOSPITAL ENCOUNTER (EMERGENCY)
Age: 49
Discharge: HOME | End: 2024-08-17
Payer: COMMERCIAL

## 2024-08-17 ENCOUNTER — APPOINTMENT (OUTPATIENT)
Dept: GENERAL RADIOLOGY | Age: 49
End: 2024-08-17
Payer: COMMERCIAL

## 2024-08-17 VITALS
SYSTOLIC BLOOD PRESSURE: 119 MMHG | TEMPERATURE: 98.3 F | OXYGEN SATURATION: 97 % | HEART RATE: 89 BPM | HEIGHT: 72 IN | RESPIRATION RATE: 18 BRPM | WEIGHT: 162.7 LBS | BODY MASS INDEX: 22.04 KG/M2 | DIASTOLIC BLOOD PRESSURE: 70 MMHG

## 2024-08-17 DIAGNOSIS — R55 SYNCOPE AND COLLAPSE: Primary | ICD-10-CM

## 2024-08-17 DIAGNOSIS — L03.119 CELLULITIS OF FOOT: ICD-10-CM

## 2024-08-17 LAB
ALBUMIN SERPL-MCNC: 4.2 G/DL (ref 3.4–5)
ALBUMIN/GLOB SERPL: 1.4 {RATIO} (ref 1.1–2.2)
ALP SERPL-CCNC: 79 U/L (ref 40–129)
ALT SERPL-CCNC: 35 U/L (ref 10–40)
ANION GAP SERPL CALCULATED.3IONS-SCNC: 12 MMOL/L (ref 3–16)
AST SERPL-CCNC: 33 U/L (ref 15–37)
BASOPHILS # BLD: 0 K/UL (ref 0–0.2)
BASOPHILS NFR BLD: 0.4 %
BILIRUB SERPL-MCNC: 0.3 MG/DL (ref 0–1)
BUN SERPL-MCNC: 11 MG/DL (ref 7–20)
CALCIUM SERPL-MCNC: 9.2 MG/DL (ref 8.3–10.6)
CHLORIDE SERPL-SCNC: 104 MMOL/L (ref 99–110)
CO2 SERPL-SCNC: 22 MMOL/L (ref 21–32)
CREAT SERPL-MCNC: 0.8 MG/DL (ref 0.9–1.3)
D-DIMER QUANTITATIVE: 0.59 UG/ML FEU (ref 0–0.6)
DEPRECATED RDW RBC AUTO: 14 % (ref 12.4–15.4)
EOSINOPHIL # BLD: 0 K/UL (ref 0–0.6)
EOSINOPHIL NFR BLD: 0.4 %
GFR SERPLBLD CREATININE-BSD FMLA CKD-EPI: >90 ML/MIN/{1.73_M2}
GLUCOSE SERPL-MCNC: 91 MG/DL (ref 70–99)
HCT VFR BLD AUTO: 43.5 % (ref 40.5–52.5)
HGB BLD-MCNC: 14.7 G/DL (ref 13.5–17.5)
LACTATE BLDV-SCNC: 1.2 MMOL/L (ref 0.4–2)
LYMPHOCYTES # BLD: 0.9 K/UL (ref 1–5.1)
LYMPHOCYTES NFR BLD: 10.1 %
MCH RBC QN AUTO: 29.9 PG (ref 26–34)
MCHC RBC AUTO-ENTMCNC: 33.9 G/DL (ref 31–36)
MCV RBC AUTO: 88.2 FL (ref 80–100)
MONOCYTES # BLD: 0.7 K/UL (ref 0–1.3)
MONOCYTES NFR BLD: 7.4 %
NEUTROPHILS # BLD: 7.2 K/UL (ref 1.7–7.7)
NEUTROPHILS NFR BLD: 81.7 %
PLATELET # BLD AUTO: 183 K/UL (ref 135–450)
PMV BLD AUTO: 7.4 FL (ref 5–10.5)
POTASSIUM SERPL-SCNC: 3.9 MMOL/L (ref 3.5–5.1)
PROT SERPL-MCNC: 7.1 G/DL (ref 6.4–8.2)
RBC # BLD AUTO: 4.93 M/UL (ref 4.2–5.9)
SODIUM SERPL-SCNC: 138 MMOL/L (ref 136–145)
TROPONIN, HIGH SENSITIVITY: <6 NG/L (ref 0–22)
TROPONIN, HIGH SENSITIVITY: <6 NG/L (ref 0–22)
TSH SERPL DL<=0.005 MIU/L-ACNC: 0.44 UIU/ML (ref 0.27–4.2)
WBC # BLD AUTO: 8.9 K/UL (ref 4–11)

## 2024-08-17 PROCEDURE — 70450 CT HEAD/BRAIN W/O DYE: CPT

## 2024-08-17 PROCEDURE — 80053 COMPREHEN METABOLIC PANEL: CPT

## 2024-08-17 PROCEDURE — 6370000000 HC RX 637 (ALT 250 FOR IP)

## 2024-08-17 PROCEDURE — 83605 ASSAY OF LACTIC ACID: CPT

## 2024-08-17 PROCEDURE — 85025 COMPLETE CBC W/AUTO DIFF WBC: CPT

## 2024-08-17 PROCEDURE — 93005 ELECTROCARDIOGRAM TRACING: CPT

## 2024-08-17 PROCEDURE — 72125 CT NECK SPINE W/O DYE: CPT

## 2024-08-17 PROCEDURE — 85379 FIBRIN DEGRADATION QUANT: CPT

## 2024-08-17 PROCEDURE — 36415 COLL VENOUS BLD VENIPUNCTURE: CPT

## 2024-08-17 PROCEDURE — 71045 X-RAY EXAM CHEST 1 VIEW: CPT

## 2024-08-17 PROCEDURE — 84443 ASSAY THYROID STIM HORMONE: CPT

## 2024-08-17 PROCEDURE — 84484 ASSAY OF TROPONIN QUANT: CPT

## 2024-08-17 PROCEDURE — 99285 EMERGENCY DEPT VISIT HI MDM: CPT

## 2024-08-17 RX ORDER — CEPHALEXIN 500 MG/1
500 CAPSULE ORAL 4 TIMES DAILY
Qty: 28 CAPSULE | Refills: 0 | Status: SHIPPED | OUTPATIENT
Start: 2024-08-17 | End: 2024-08-24

## 2024-08-17 RX ORDER — CEPHALEXIN 500 MG/1
500 CAPSULE ORAL ONCE
Status: COMPLETED | OUTPATIENT
Start: 2024-08-17 | End: 2024-08-17

## 2024-08-17 RX ADMIN — CEPHALEXIN 500 MG: 500 CAPSULE ORAL at 20:58

## 2024-08-17 ASSESSMENT — PAIN DESCRIPTION - LOCATION: LOCATION: GROIN

## 2024-08-17 ASSESSMENT — PAIN - FUNCTIONAL ASSESSMENT: PAIN_FUNCTIONAL_ASSESSMENT: 0-10

## 2024-08-17 ASSESSMENT — PAIN DESCRIPTION - ORIENTATION: ORIENTATION: LEFT

## 2024-08-17 ASSESSMENT — PAIN SCALES - GENERAL: PAINLEVEL_OUTOF10: 5

## 2024-08-17 NOTE — ED PROVIDER NOTES
[facility] EMERGENCY DEPARTMENT  EMERGENCY DEPARTMENT ENCOUNTER        Pt Name: Ozzy Abrams  MRN: [id number]  Birthdate 1975  Date of evaluation: 8/17/2024  Provider: KILEY Dick - TONY  PCP: Nathan Davis MD  Note Started: 6:36 PM EDT 8/17/24      KRISTEN. I have evaluated this patient.        CHIEF COMPLAINT       Chief Complaint   Patient presents with    Loss of Consciousness     The patient passed out yesterday at work at 0730 when he was standing at a print table and got diaphoretic, the patient hit his head and shoulder     Groin Pain     Left groin pain, that started after playing baseball with his son Thursday and has some redness and swelling on left foot 6/10       HISTORY OF PRESENT ILLNESS: 1 or more Elements     History From: Pt    Limitations to history : None    Social Determinants Significantly Affecting Health : Patient has significant healthcare illiteracy    Chief Complaint:above    Ozzy Abrams is a 48 y.o. male who  has a past medical history of Allergic rhinitis, cause unspecified, Influenza with other respiratory manifestations, Pruritus ani, and Unspecified hypothyroidism. presents to ED with concern for syncope and collapse yesterday.  He was at work, started feeling lightheaded.  His cross told him he was pale.  He sat down on a chair and the next he remembers being on the ground.  He went home and slept for many hours.  Now reports headache like a spike through his head.  No chest pain.  No alleviating factors.  Also reports some redness and swelling to left foot and left groin.  Positive family history of dad with Mi in 50s    The patient  reports that he has been smoking cigarettes. He has never used smokeless tobacco. He reports current alcohol use. He reports current drug use.     Nursing Notes were all reviewed and agreed with or any disagreements were addressed in the HPI.    REVIEW OF SYSTEMS :      Review of Systems    Positives

## 2024-08-17 NOTE — ED TRIAGE NOTES
.Ozzy Abrams is a 48 y.o. male brought himself to the ER for eval of LOC. The patient states that he passed out yesterday at work. The patient states that he was standing at the printing table at 0730 when he got diaphoretic and passed out. The patient states that he hit his head and shoulder, no blood thinners. The patient states that he has left groin and foot pain pan and redness that started after playing baseball with his son Thursday 6/10. The patient is alert and oriented with an open and patent airway with a normal respiratory effort.

## 2024-08-18 LAB
EKG ATRIAL RATE: 75 BPM
EKG DIAGNOSIS: NORMAL
EKG P AXIS: 49 DEGREES
EKG P-R INTERVAL: 158 MS
EKG Q-T INTERVAL: 362 MS
EKG QRS DURATION: 100 MS
EKG QTC CALCULATION (BAZETT): 404 MS
EKG R AXIS: 50 DEGREES
EKG T AXIS: 4 DEGREES
EKG VENTRICULAR RATE: 75 BPM

## 2024-08-18 PROCEDURE — 93010 ELECTROCARDIOGRAM REPORT: CPT | Performed by: INTERNAL MEDICINE

## 2024-08-18 NOTE — DISCHARGE INSTRUCTIONS
any abnormal tests repeated.  Your doctor or a referral doctor can let you know when this should be done.  Also make sure your doctor contacts this hospital to get any test results that are not yet available such as culture results or special tests for infection and final imaging reports, which are often not available at the time you leave the ER but which may list additional important findings that are not on the preliminary report.  BLOOD PRESSURE & GLUCOSE: If your blood pressure was greater than 120/80 or your glucose level was >100 have it rechecked within 1 to 2 weeks.  The stress of your current condition can often cause a temporary rise in blood pressure or glucose level, but many people have undiagnosed hypertension or pre-diabetes.  MEDICATION SIDE EFFECTS: Do not drive, walk, bike, take the bus, etc. if you have received or are being prescribed any sedating medications such as those for pain or anxiety or certain  antihistamines like Benadryl.  If you have been give one of these here, get a taxi home or have a friend drive you home.  Ask your pharmacist to  you on potential side effects of any new medication.  If you are being prescribed antibiotics take a probiotic or eat yogurt and realize birth control pills may be less effective.  WHAT IF I DON’T GET BETTER: Even with the best laid treatment plan, there is typically a 5-10% chance that you will not improve as expected or even get worse.  If you get worse you should return here as soon as possible; the wait is typically shortest in the morning.  If you stay the same or only improve partially, instead of returning here it may be reasonable to see a primary care provider for follow-up care and a second opinion.

## 2024-09-01 DIAGNOSIS — E03.9 HYPOTHYROIDISM, UNSPECIFIED TYPE: ICD-10-CM

## 2024-09-03 ENCOUNTER — LAB (OUTPATIENT)
Dept: FAMILY MEDICINE CLINIC | Age: 49
End: 2024-09-03
Payer: COMMERCIAL

## 2024-09-03 DIAGNOSIS — Z00.00 PREVENTATIVE HEALTH CARE: Primary | ICD-10-CM

## 2024-09-03 PROCEDURE — 36415 COLL VENOUS BLD VENIPUNCTURE: CPT | Performed by: NURSE PRACTITIONER

## 2024-09-03 RX ORDER — LEVOTHYROXINE SODIUM 175 UG/1
175 TABLET ORAL DAILY
Qty: 90 TABLET | Refills: 3 | Status: SHIPPED | OUTPATIENT
Start: 2024-09-03

## 2024-09-03 NOTE — TELEPHONE ENCOUNTER
Last office visit 9/11/2023       Next office visit scheduled 9/3/2024    Requested Prescriptions     Pending Prescriptions Disp Refills    levothyroxine (SYNTHROID) 175 MCG tablet [Pharmacy Med Name: LEVOTHYROXINE 175 MCG TABLET] 90 tablet 3     Sig: TAKE 1 TABLET BY MOUTH EVERY DAY

## 2024-09-04 LAB
ALBUMIN SERPL-MCNC: 4.5 G/DL (ref 3.4–5)
ALBUMIN/GLOB SERPL: 2.3 {RATIO} (ref 1.1–2.2)
ALP SERPL-CCNC: 78 U/L (ref 40–129)
ALT SERPL-CCNC: 30 U/L (ref 10–40)
ANION GAP SERPL CALCULATED.3IONS-SCNC: 10 MMOL/L (ref 3–16)
AST SERPL-CCNC: 25 U/L (ref 15–37)
BASOPHILS # BLD: 0.1 K/UL (ref 0–0.2)
BASOPHILS NFR BLD: 1.2 %
BILIRUB SERPL-MCNC: <0.2 MG/DL (ref 0–1)
BUN SERPL-MCNC: 16 MG/DL (ref 7–20)
CALCIUM SERPL-MCNC: 9.2 MG/DL (ref 8.3–10.6)
CHLORIDE SERPL-SCNC: 101 MMOL/L (ref 99–110)
CO2 SERPL-SCNC: 24 MMOL/L (ref 21–32)
CREAT SERPL-MCNC: 0.8 MG/DL (ref 0.9–1.3)
DEPRECATED RDW RBC AUTO: 13.5 % (ref 12.4–15.4)
EOSINOPHIL # BLD: 0.1 K/UL (ref 0–0.6)
EOSINOPHIL NFR BLD: 2.3 %
EST. AVERAGE GLUCOSE BLD GHB EST-MCNC: 108.3 MG/DL
GFR SERPLBLD CREATININE-BSD FMLA CKD-EPI: >90 ML/MIN/{1.73_M2}
GLUCOSE SERPL-MCNC: 103 MG/DL (ref 70–99)
HBA1C MFR BLD: 5.4 %
HCT VFR BLD AUTO: 37.5 % (ref 40.5–52.5)
HGB BLD-MCNC: 13.2 G/DL (ref 13.5–17.5)
LYMPHOCYTES # BLD: 1.8 K/UL (ref 1–5.1)
LYMPHOCYTES NFR BLD: 37 %
MCH RBC QN AUTO: 30.3 PG (ref 26–34)
MCHC RBC AUTO-ENTMCNC: 35.1 G/DL (ref 31–36)
MCV RBC AUTO: 86.3 FL (ref 80–100)
MONOCYTES # BLD: 0.6 K/UL (ref 0–1.3)
MONOCYTES NFR BLD: 13.3 %
NEUTROPHILS # BLD: 2.2 K/UL (ref 1.7–7.7)
NEUTROPHILS NFR BLD: 46.2 %
PLATELET # BLD AUTO: 250 K/UL (ref 135–450)
PMV BLD AUTO: 8.1 FL (ref 5–10.5)
POTASSIUM SERPL-SCNC: 4.1 MMOL/L (ref 3.5–5.1)
PROT SERPL-MCNC: 6.5 G/DL (ref 6.4–8.2)
RBC # BLD AUTO: 4.34 M/UL (ref 4.2–5.9)
SODIUM SERPL-SCNC: 135 MMOL/L (ref 136–145)
T4 FREE SERPL-MCNC: 1.6 NG/DL (ref 0.9–1.8)
TSH SERPL DL<=0.005 MIU/L-ACNC: 0.35 UIU/ML (ref 0.27–4.2)
WBC # BLD AUTO: 4.8 K/UL (ref 4–11)

## 2025-08-28 ENCOUNTER — PATIENT MESSAGE (OUTPATIENT)
Dept: FAMILY MEDICINE CLINIC | Age: 50
End: 2025-08-28

## 2025-08-28 DIAGNOSIS — E03.9 HYPOTHYROIDISM, UNSPECIFIED TYPE: ICD-10-CM

## 2025-08-28 RX ORDER — LEVOTHYROXINE SODIUM 175 UG/1
175 TABLET ORAL DAILY
Qty: 90 TABLET | Refills: 0 | Status: SHIPPED | OUTPATIENT
Start: 2025-08-28